# Patient Record
Sex: MALE | Race: BLACK OR AFRICAN AMERICAN | Employment: UNEMPLOYED | URBAN - METROPOLITAN AREA
[De-identification: names, ages, dates, MRNs, and addresses within clinical notes are randomized per-mention and may not be internally consistent; named-entity substitution may affect disease eponyms.]

---

## 2018-12-31 ENCOUNTER — HOSPITAL ENCOUNTER (EMERGENCY)
Facility: CLINIC | Age: 63
Discharge: HOME | End: 2019-01-01
Attending: EMERGENCY MEDICINE | Admitting: EMERGENCY MEDICINE

## 2018-12-31 DIAGNOSIS — F19.10 SUBSTANCE ABUSE (H): ICD-10-CM

## 2018-12-31 DIAGNOSIS — R40.4 TRANSIENT ALTERATION OF AWARENESS: ICD-10-CM

## 2018-12-31 LAB
ALBUMIN SERPL-MCNC: 2.8 G/DL (ref 3.4–5)
ALP SERPL-CCNC: 77 U/L (ref 40–150)
ALT SERPL W P-5'-P-CCNC: 15 U/L (ref 0–70)
ANION GAP SERPL CALCULATED.3IONS-SCNC: 4 MMOL/L (ref 3–14)
AST SERPL W P-5'-P-CCNC: 21 U/L (ref 0–45)
BASOPHILS # BLD AUTO: 0 10E9/L (ref 0–0.2)
BASOPHILS NFR BLD AUTO: 0.3 %
BILIRUB SERPL-MCNC: 0.5 MG/DL (ref 0.2–1.3)
BUN SERPL-MCNC: 20 MG/DL (ref 7–30)
CALCIUM SERPL-MCNC: 8 MG/DL (ref 8.5–10.1)
CHLORIDE SERPL-SCNC: 110 MMOL/L (ref 94–109)
CO2 SERPL-SCNC: 28 MMOL/L (ref 20–32)
CREAT SERPL-MCNC: 1.85 MG/DL (ref 0.66–1.25)
DIFFERENTIAL METHOD BLD: ABNORMAL
EOSINOPHIL # BLD AUTO: 0.1 10E9/L (ref 0–0.7)
EOSINOPHIL NFR BLD AUTO: 1.2 %
ERYTHROCYTE [DISTWIDTH] IN BLOOD BY AUTOMATED COUNT: 13.2 % (ref 10–15)
ETHANOL SERPL-MCNC: <0.01 G/DL
GFR SERPL CREATININE-BSD FRML MDRD: 38 ML/MIN/{1.73_M2}
GLUCOSE SERPL-MCNC: 98 MG/DL (ref 70–99)
HCT VFR BLD AUTO: 31.6 % (ref 40–53)
HGB BLD-MCNC: 10.4 G/DL (ref 13.3–17.7)
IMM GRANULOCYTES # BLD: 0 10E9/L (ref 0–0.4)
IMM GRANULOCYTES NFR BLD: 0.3 %
INTERPRETATION ECG - MUSE: NORMAL
LYMPHOCYTES # BLD AUTO: 1.5 10E9/L (ref 0.8–5.3)
LYMPHOCYTES NFR BLD AUTO: 20.4 %
MCH RBC QN AUTO: 28.1 PG (ref 26.5–33)
MCHC RBC AUTO-ENTMCNC: 32.9 G/DL (ref 31.5–36.5)
MCV RBC AUTO: 85 FL (ref 78–100)
MONOCYTES # BLD AUTO: 0.5 10E9/L (ref 0–1.3)
MONOCYTES NFR BLD AUTO: 6.3 %
NEUTROPHILS # BLD AUTO: 5.4 10E9/L (ref 1.6–8.3)
NEUTROPHILS NFR BLD AUTO: 71.5 %
NRBC # BLD AUTO: 0 10*3/UL
NRBC BLD AUTO-RTO: 0 /100
PLATELET # BLD AUTO: 193 10E9/L (ref 150–450)
POTASSIUM SERPL-SCNC: 4.1 MMOL/L (ref 3.4–5.3)
PROT SERPL-MCNC: 5.8 G/DL (ref 6.8–8.8)
RBC # BLD AUTO: 3.7 10E12/L (ref 4.4–5.9)
SODIUM SERPL-SCNC: 142 MMOL/L (ref 133–144)
WBC # BLD AUTO: 7.6 10E9/L (ref 4–11)

## 2018-12-31 PROCEDURE — 80053 COMPREHEN METABOLIC PANEL: CPT | Performed by: EMERGENCY MEDICINE

## 2018-12-31 PROCEDURE — 99285 EMERGENCY DEPT VISIT HI MDM: CPT | Mod: 25

## 2018-12-31 PROCEDURE — 85025 COMPLETE CBC W/AUTO DIFF WBC: CPT | Performed by: EMERGENCY MEDICINE

## 2018-12-31 PROCEDURE — 25000128 H RX IP 250 OP 636: Performed by: EMERGENCY MEDICINE

## 2018-12-31 PROCEDURE — 96374 THER/PROPH/DIAG INJ IV PUSH: CPT

## 2018-12-31 PROCEDURE — 96375 TX/PRO/DX INJ NEW DRUG ADDON: CPT

## 2018-12-31 PROCEDURE — 80320 DRUG SCREEN QUANTALCOHOLS: CPT | Performed by: EMERGENCY MEDICINE

## 2018-12-31 PROCEDURE — 93005 ELECTROCARDIOGRAM TRACING: CPT

## 2018-12-31 RX ORDER — NALOXONE HYDROCHLORIDE 0.4 MG/ML
0.4 INJECTION, SOLUTION INTRAMUSCULAR; INTRAVENOUS; SUBCUTANEOUS
Status: DISCONTINUED | OUTPATIENT
Start: 2018-12-31 | End: 2019-01-01 | Stop reason: HOSPADM

## 2018-12-31 RX ADMIN — NALOXONE HYDROCHLORIDE 0.4 MG: 0.4 INJECTION, SOLUTION INTRAMUSCULAR; INTRAVENOUS; SUBCUTANEOUS at 22:46

## 2018-12-31 RX ADMIN — NALOXONE HYDROCHLORIDE 0.4 MG: 0.4 INJECTION, SOLUTION INTRAMUSCULAR; INTRAVENOUS; SUBCUTANEOUS at 23:24

## 2018-12-31 ASSESSMENT — MIFFLIN-ST. JEOR: SCORE: 1822.77

## 2019-01-01 ENCOUNTER — APPOINTMENT (OUTPATIENT)
Dept: CT IMAGING | Facility: CLINIC | Age: 64
End: 2019-01-01
Attending: EMERGENCY MEDICINE

## 2019-01-01 VITALS
SYSTOLIC BLOOD PRESSURE: 151 MMHG | HEIGHT: 72 IN | BODY MASS INDEX: 29.26 KG/M2 | RESPIRATION RATE: 20 BRPM | OXYGEN SATURATION: 97 % | DIASTOLIC BLOOD PRESSURE: 99 MMHG | TEMPERATURE: 99.2 F | HEART RATE: 77 BPM | WEIGHT: 216 LBS

## 2019-01-01 LAB
ALBUMIN UR-MCNC: NEGATIVE MG/DL
AMPHETAMINES UR QL SCN: NEGATIVE
APPEARANCE UR: CLEAR
BARBITURATES UR QL: NEGATIVE
BENZODIAZ UR QL: POSITIVE
BILIRUB UR QL STRIP: NEGATIVE
CANNABINOIDS UR QL SCN: NEGATIVE
COCAINE UR QL: POSITIVE
COLOR UR AUTO: ABNORMAL
GLUCOSE UR STRIP-MCNC: 30 MG/DL
HGB UR QL STRIP: NEGATIVE
HYALINE CASTS #/AREA URNS LPF: 4 /LPF (ref 0–2)
KETONES UR STRIP-MCNC: NEGATIVE MG/DL
LEUKOCYTE ESTERASE UR QL STRIP: NEGATIVE
MUCOUS THREADS #/AREA URNS LPF: PRESENT /LPF
NITRATE UR QL: NEGATIVE
OPIATES UR QL SCN: POSITIVE
PCP UR QL SCN: NEGATIVE
PH UR STRIP: 5 PH (ref 5–7)
RBC #/AREA URNS AUTO: <1 /HPF (ref 0–2)
SOURCE: ABNORMAL
SP GR UR STRIP: 1.01 (ref 1–1.03)
SQUAMOUS #/AREA URNS AUTO: 1 /HPF (ref 0–1)
UROBILINOGEN UR STRIP-MCNC: NORMAL MG/DL (ref 0–2)
WBC #/AREA URNS AUTO: 2 /HPF (ref 0–5)

## 2019-01-01 PROCEDURE — 81001 URINALYSIS AUTO W/SCOPE: CPT | Performed by: EMERGENCY MEDICINE

## 2019-01-01 PROCEDURE — 70450 CT HEAD/BRAIN W/O DYE: CPT

## 2019-01-01 PROCEDURE — 80307 DRUG TEST PRSMV CHEM ANLYZR: CPT | Performed by: EMERGENCY MEDICINE

## 2019-01-01 NOTE — ED NOTES
Patient lives in [facility]; has keys to his [facility] for re-entry tonight and will be transported home by cab provided by [facility] ED per MDs request.

## 2019-01-01 NOTE — ED NOTES
Bed: ED25  Expected date: 12/31/18  Expected time: 8:56 PM  Means of arrival: Ambulance  Comments:  Geni [age]M ingestion; delirium

## 2019-01-01 NOTE — ED PROVIDER NOTES
"  History     Chief Complaint:  Altered Mental Status     The history is provided by the EMS personnel and the patient. The history is limited by the condition of the patient.      Vipul Moreland is a 63 year old male with a history of hypertension who presents to the emergency department [facility] for evaluation of altered mental status. [facility], [facility], the patient was reportedly found \"not acting right and altered\" per friends who were prompted to contact EMS to transfer the patient to the ED for further evaluation. Friends were inquiring of whether or not EMS administered Narcan as he has a history of ingestion. Here, the patient states that he does not know why he is here and states that he fell asleep and the next thing he knows he woke up in the emergency department. He states that he does smoke tobacco and occasionally drinks. He states that he may have taken recreational drugs prior to sleeping, but he does not specify what he took or how much of it he took stating that he \"wants to leave\" and \"that's just the way it goes.\" He currently denies any suicidal or homicidal ideation.     Allergies:  NKDA    Medications:    Aspirin 81 mg  Atenolol  Hydrochlorothiazide   Indomethacin   Lisinopril    Past Medical History:    Hypertension  Chlamydia     Past Surgical History:    The patient does not have any pertinent past surgical history.    Family History:    No past pertinent family history.    Social History:  Marital Status:  Significant other [7]  Tobacco Use: Yes, 0.50 packs/day  Alcohol Use: Yes, occasionally      Review of Systems   Unable to perform ROS: Mental status change   Psychiatric/Behavioral: Positive for self-injury (Possible Ingestion). Negative for suicidal ideas.         Physical Exam     Patient Vitals for the past 24 hrs:   BP Temp Temp src Pulse Heart Rate Resp SpO2 Height Weight   12/31/18 2330 137/81 -- -- 86 89 15 97 % -- --   12/31/18 2314 -- -- -- -- 86 22 " 97 % -- --   12/31/18 2259 -- -- -- -- 98 29 100 % -- --   12/31/18 2247 -- -- -- -- 90 18 98 % -- --   12/31/18 2230 -- -- -- -- 94 20 96 % -- --   12/31/18 2202 137/69 99.2  F (37.3  C) Temporal -- 100 20 99 % 1.829 m (6') 98 kg (216 lb)       Physical Exam  General: Somnolent but arousable  Head:  Scalp is atraumatic  Eyes:  The pupils are pinpoint equal, round, and reactive to light    EOM's intact    No scleral icterus  ENT:      Nose:  The external nose is normal  Ears:  External ears are normal  Mouth/Throat: The oropharynx is normal    Mucus membranes are moist      Neck:  Normal range of motion.      There is no rigidity.    Trachea is in the midline         CV:  Regular rhythm. Tachycardic    No murmur   Resp:  Breath sounds are clear bilaterally    Non-labored, no retractions or accessory muscle use      GI:  Abdomen is soft, no distension, no tenderness.       MS:  Normal strength in all 4 extremities  Skin:  Warm and dry, No rash or lesions noted.  Neuro: Strength 5/5 x4.  Sensation intact  In all 4 extremities.        Psych:  Somnolent but easily arousable. Denies suicidal ideation.       Emergency Department Course   ECG:  Indication: Possible Ingestion  Time: 2121 Vent. Rate 105 bpm. WY interval 196. QRS duration 86. QT/QTc 356/470. P-R-T axis 60 5 7. Sinus tachycardia. Moderate voltage criteria for LVH, may be normal variant. Cannot rule out inferior infarct, age undetermined. Abnormal ECG. Read time: 2217    Imaging:   CT Head without contrast:   No acute abnormality. As per radiology.    Laboratory:  CBC: WBC: 7.6, HGB: 10.4 (L), PLT: 193  CMP: Chloride: 110 (H), Calcium: 8.0 (L), Albumin: 2.8 (L), Protein Total: 5.8 (L), GFR: 38 (L), Creatinine: 1.85 (H), o/w WNL   Alcohol Ethyl: <0.01  UA with micro: Glucose: 30, Mucous: Present, Hyaline Casts: 4 (H) o/w negative  Drug Abuse Urine: Benzodiazepines: Positive, Cocaine: Positive, Opiates: Positive, o/w negative     Interventions:  2324 Narcan 0.4  mg, IV    Emergency Department Course:  2219 Nursing notes and vitals reviewed. I performed an exam of the patient as documented above.     IV inserted. Medicine administered as documented above. Blood drawn. This was sent to the lab for further testing, results above.    The patient provided a urine sample here in the emergency department. This was sent for laboratory testing, findings above.     The patient was sent for a CT of the head while in the emergency department, findings above.     EKG obtained in the ED, see results above.     0025 I rechecked the patient and discussed the results of his workup thus far. Patient is more awake. He is not commenting on what drugs he took, but denies any trauma.     0223 I rechecked the patient and informed him of the results of his workup. Patient is at his baseline and admits drug use when informed of his grossly positive urine drug screen.     Findings and plan explained to the patient. Patient discharged home with instructions regarding supportive care, medications, and reasons to return. The importance of close follow-up was reviewed.     I personally reviewed the laboratory results with the patient and answered all related questions prior to discharge.       Impression & Plan      Medical Decision Making:  Vipul Moreland is a 63 year old male who presents via EMS. They were called for an episode of altered mental status. The patient was intermittently agitated and received Haldol and Versed. At arrival he cant provide a significant history therefore the above workup was undertaken. He was allowed to metabolize his ingestion which he cannot relay what it was. He was observed int he ED for over 6 hours. His mental status cleared and he was subsequently discharged to home. He denies any suicidal or homicidal ideation. There are no signs of acute traumatic injury, no signs of electrolyte abnormality or more concerning illness. No signs of infectious etiology. Patient  transported home via Taxi cab.       Diagnosis:    ICD-10-CM    1. Substance abuse (H) F19.10    2. Transient alteration of awareness R40.4        Disposition:  discharged to home    Discharge Medications:     Medication List      There are no discharge medications for this visit.       Scribe Disclosure:  I, Tristen Modi, am serving as a scribe on 12/31/2018 at 10:19 PM to personally document services performed by Arnulfo Caro MD based on my observations and the provider's statements to me.     Tristen Modi  12/31/2018    EMERGENCY DEPARTMENT       Arnulfo Caro MD  01/01/19 0354